# Patient Record
Sex: FEMALE | Race: WHITE | Employment: OTHER | URBAN - METROPOLITAN AREA
[De-identification: names, ages, dates, MRNs, and addresses within clinical notes are randomized per-mention and may not be internally consistent; named-entity substitution may affect disease eponyms.]

---

## 2018-08-25 PROBLEM — R07.9 CHEST PAIN: Status: ACTIVE | Noted: 2018-08-25

## 2018-08-27 PROBLEM — E03.9 HYPOTHYROIDISM: Status: ACTIVE | Noted: 2018-08-27

## 2018-08-27 PROBLEM — R94.39 ABNORMAL STRESS TEST: Status: ACTIVE | Noted: 2018-08-27

## 2018-08-27 PROBLEM — I10 HTN (HYPERTENSION): Status: ACTIVE | Noted: 2018-08-27

## 2018-08-27 PROBLEM — F11.20 OPIOID DEPENDENCE (HCC): Status: ACTIVE | Noted: 2018-08-27

## 2018-09-13 ENCOUNTER — HOSPITAL ENCOUNTER (OUTPATIENT)
Dept: OTHER | Age: 63
Discharge: HOME | End: 2018-09-13
Attending: NURSE PRACTITIONER | Admitting: NURSE PRACTITIONER

## 2018-09-13 ENCOUNTER — OFFICE VISIT (OUTPATIENT)
Dept: CARDIOLOGY CLINIC | Age: 63
End: 2018-09-13

## 2018-09-13 VITALS
DIASTOLIC BLOOD PRESSURE: 70 MMHG | WEIGHT: 172 LBS | HEART RATE: 80 BPM | SYSTOLIC BLOOD PRESSURE: 110 MMHG | HEIGHT: 64 IN | BODY MASS INDEX: 29.37 KG/M2

## 2018-09-13 DIAGNOSIS — I10 ESSENTIAL HYPERTENSION: ICD-10-CM

## 2018-09-13 DIAGNOSIS — Z79.899 LONG TERM CURRENT USE OF ANTIPSYCHOTIC MEDICATION: ICD-10-CM

## 2018-09-13 DIAGNOSIS — I25.10 CORONARY ARTERY DISEASE INVOLVING NATIVE CORONARY ARTERY OF NATIVE HEART WITHOUT ANGINA PECTORIS: Primary | ICD-10-CM

## 2018-09-13 LAB
ANION GAP SERPL CALCULATED.3IONS-SCNC: 12 MMOL/L (ref 3–16)
BUN BLDV-MCNC: 10 MG/DL (ref 7–20)
CALCIUM SERPL-MCNC: 9.3 MG/DL (ref 8.3–10.6)
CHLORIDE BLD-SCNC: 97 MMOL/L (ref 99–110)
CO2: 28 MMOL/L (ref 21–32)
CREAT SERPL-MCNC: 0.8 MG/DL (ref 0.6–1.2)
GFR AFRICAN AMERICAN: >60
GFR NON-AFRICAN AMERICAN: >60
GLUCOSE BLD-MCNC: 364 MG/DL (ref 70–99)
POTASSIUM SERPL-SCNC: 4.3 MMOL/L (ref 3.5–5.1)
SODIUM BLD-SCNC: 137 MMOL/L (ref 136–145)

## 2018-09-13 PROCEDURE — G8598 ASA/ANTIPLAT THER USED: HCPCS | Performed by: NURSE PRACTITIONER

## 2018-09-13 PROCEDURE — 1111F DSCHRG MED/CURRENT MED MERGE: CPT | Performed by: NURSE PRACTITIONER

## 2018-09-13 PROCEDURE — G8427 DOCREV CUR MEDS BY ELIG CLIN: HCPCS | Performed by: NURSE PRACTITIONER

## 2018-09-13 PROCEDURE — 99213 OFFICE O/P EST LOW 20 MIN: CPT | Performed by: NURSE PRACTITIONER

## 2018-09-13 PROCEDURE — 4004F PT TOBACCO SCREEN RCVD TLK: CPT | Performed by: NURSE PRACTITIONER

## 2018-09-13 PROCEDURE — G8419 CALC BMI OUT NRM PARAM NOF/U: HCPCS | Performed by: NURSE PRACTITIONER

## 2018-09-13 PROCEDURE — 3017F COLORECTAL CA SCREEN DOC REV: CPT | Performed by: NURSE PRACTITIONER

## 2018-09-13 RX ORDER — HYDROCHLOROTHIAZIDE 25 MG/1
25 TABLET ORAL DAILY
COMMUNITY
End: 2018-12-05 | Stop reason: SDUPTHER

## 2018-09-13 NOTE — COMMUNICATION BODY
[address]     Outpatient Follow Up Note    CHIEF COMPLAINT / HPI: Hospital Follow Up secondary to status post coronary artery stenting    Hospital record has been reviewed  Hospital Course progressed as follows per discharge summary:   pressents with left sided chest pain with radiation to left arm associated with perspiration and dizziness x 1 day. The pt is visiting her daughter in [facility] . She lver in [place] She has never experienced similar sx in the past. She does not have any pain at this time. No recent stress test. No fever, chills  She also reports chronic intermittent left side d abdominal pain . ~Abnormal stress test  - s/p Severe 1 vessel CAD involving the LAD with successful revascularization to luminal diameter of 3.0 mm with [facility] on 8/27/18  - On aspirin/effient, statin, beta blocker     ~Hypertension : Blood pressure stable, continue Coreg 6.25 mg twice a day, hydrochlorothiazide 50 mg daily (disch summary inst to stop taking), lisinopril 5 mg daily (disch inst for benazpril)     ~Insulin-dependent diabetes mellitus  - Hemoglobin A1c is 12.2, consult diabetic educator; Continue Lantus 40 units daily  ~Opioid dependence: Continue Subutex 8 mg 3 times a day daily  ~Benzo Withdrawal:  start xanax 0.5mg TID       Melinda Rollins is 59 y.o. female who presents today for a routine follow up after a recent hospitalization related to the above mentioned issues. She recalls having LUE numbness and felt palpitations in her chest.  Subjective:   Since the time of discharge, the patient admits their symptoms have improved. She denies significant chest pain. There is no SOB/GREEN. The patient is not experiencing palpitations. She developed swelling in her feet about a week after discharge. She started taking HCTZ 4 days ago    These symptoms are improving over the last few weeks.  Her home BP runs around 120/60    With regard to medication therapy the patient has been compliant with prescribed

## 2018-09-13 NOTE — PROGRESS NOTES
EXT: dago LE pitting edema, no calf tenderness. Pulses are present bilaterally. DATA:    Lab Results   Component Value Date    ALT 14 08/25/2018    AST 16 08/25/2018    ALKPHOS 72 08/25/2018    BILITOT <0.2 08/25/2018     Lab Results   Component Value Date    CREATININE 0.9 08/29/2018    BUN 11 08/29/2018     08/29/2018    K 3.2 (L) 08/29/2018     08/29/2018    CO2 27 08/29/2018     No results found for: TSH, R9FGZXT, T8YSIYP, THYROIDAB  Lab Results   Component Value Date    WBC 10.5 08/29/2018    HGB 12.1 08/29/2018    HCT 35.2 (L) 08/29/2018    .0 (H) 08/29/2018     08/29/2018     No components found for: CHLPL  Lab Results   Component Value Date    TRIG 214 (H) 08/28/2018     Lab Results   Component Value Date    HDL 27 (L) 08/28/2018     Lab Results   Component Value Date    LDLCALC 94 08/28/2018     Lab Results   Component Value Date    LABVLDL 43 08/28/2018     Radiology Review:  Pertinent images / reports were reviewed as a part of this visit and reveals the following:    Last Stress Test: 8/27/18:      Summary       Exercise EKG with 1mm inferolateral downsloping ST depression       Normal LV size and systolic function.       There is normal isotope uptake at stress and rest. There is no evidence of    myocardial ischemia or scar       Cardiac Cath 8/27/18:  Anatomy:   LM-normal  LAD-20% proximal, eccentric, calcified, 90% mid, calcified  Cx-normal  OM1- normal  RCA-40% mid with FFR 0.89  RPDA- patent  LVEF- 60%, LVEDP 10 mmHg  PCI: LAD 90% to 0% with a 2.5 mm x 18 mm Xience Namita [facility] post dilated with a 3.0 mm NC balloon     Impression:  1.  Severe 1 vessel CAD involving the LAD with successful revascularization to luminal diameter of 3.0 mm with [facility]. 2.  Mild-to-moderate 1 vessel CAD involving the RCA with FFR 0.89. 3.  Normal LV systolic function.     Plan:  1.  Enteric-coated aspirin 81 mg daily and definitely.   2.  Effient/equivalent for minimum of 6-12 months preferably closer to 12 months. 3.  Change simvastatin to atorvastatin.  Goal LDL less than 70. Assessment:      Diagnosis Orders   1. Coronary artery disease involving native coronary artery of native heart without angina pectoris   ~s/p PTCA LAD  ~denies recurrence of CP  ~exercise limited d/t neck/back pain from previous AAs  ~stopped smoking at discharge     2. Essential hypertension   ~controlled     3. Long term current use of antipsychotic medication   ~diuretic / ace  ~low K+ during hospitalization ; misinformation regarding HCTZ (? Stop or take)  ~ace : changed from lisinopril to benazepril  Basic Metabolic Panel       Patient  is stable since hospital discharge. Plan:  BMP today    ~consider either changing diuretic or adding aldactone for edema   ~inst to limit intake of salty snacks   F/U in four weeks at our [address] [facility] location     I have addressed the patient's cardiac risk factors and adjusted pharmacologic treatment as needed. In addition, I have reinforced the need for patient directed risk factor modification. Further evaluation will be based upon the patient's clinical course and testing results. All questions and concerns were addressed to the patient. Alternatives to  treatment were discussed. The patient  currently  is not smoking: stopped two weeks ago from 1.5 ppd. The risks related to smoking were reviewed with the patient. Recommend maintaining a smoke-free lifestyle. Products available for smoking cessation were discussed > using Nicoderm . Dual Antiplatelet therapy  has been recommended / prescribed for this patient. Education conducted on adverse reactions including bleeding was discussed. The patient verbalizes understanding. Pt is on a BB  Pt is on an ace-i  Pt is on a statin      Saturated fat diet discussed : SMBG 123  Exercise program discussed    Thank you for allowing to us to participate in the care of Elroy DevineLuis Miguel [address]  Documentation of today's visit sent to PCP

## 2018-09-13 NOTE — LETTER
[address] [facility] - [address]  [address] [facility] [address]  Phone: [phone]  Fax: [phone]      September 13, 2018     Referring Not In System (Inactive)  No address on file    Patient: Steffi Houston  MR Number: [id number]  YOB: 1955  Date of Visit: 9/13/2018    Dear Primary NP: Gerard Mishra, [address] [facility]     Outpatient Follow Up Note    [facility] / HPI: Hospital Follow Up secondary to status post coronary artery stenting    Hospital record has been reviewed  Hospital Course progressed as follows per discharge summary:   pressents with left sided chest pain with radiation to left arm associated with perspiration and dizziness x 1 day. The pt is visiting her daughter in [facility] . She lver in [place] She has never experienced similar sx in the past. She does not have any pain at this time. No recent stress test. No fever, chills  She also reports chronic intermittent left side d abdominal pain . ~Abnormal stress test  - s/p Severe 1 vessel CAD involving the LAD with successful revascularization to luminal diameter of 3.0 mm with [facility] on 8/27/18  - On aspirin/effient, statin, beta blocker     ~Hypertension : Blood pressure stable, continue Coreg 6.25 mg twice a day, hydrochlorothiazide 50 mg daily (disch summary inst to stop taking), lisinopril 5 mg daily (disch inst for benazpril)     ~Insulin-dependent diabetes mellitus  - Hemoglobin A1c is 12.2, consult diabetic educator; Continue Lantus 40 units daily  ~Opioid dependence: Continue Subutex 8 mg 3 times a day daily  ~Benzo Withdrawal:  start xanax 0.5mg TID       Steffi Houston is 59 y.o. female who presents today for a routine follow up after a recent hospitalization related to the above mentioned issues. She recalls having LUE numbness and felt palpitations in her chest.  Subjective:   Since the time of discharge, the patient admits their symptoms have improved. Cuff Size: Medium Adult    Pulse: 80    Weight: 172 lb (78 kg)    Height: 5' 4\" (1.626 m)         VITALS:  /62 (Site: Left Upper Arm, Position: Sitting, Cuff Size: Medium Adult)   Pulse 80   Ht 5' 4\" (1.626 m)   Wt 172 lb (78 kg)   BMI 29.52 kg/m²      CONSTITUTIONAL: Cooperative, no apparent distress, and appears well nourished / developed  NEUROLOGIC:  Awake and orientated to person, place and time. PSYCH: Calm affect. SKIN: Warm and dry; Rt groin unremarkable. HEENT: Sclera non-icteric, normocephalic, neck supple, no elevation of JVP, normal carotid pulses with no bruits and thyroid normal size. LUNGS:  No increased work of breathing and slightly diminished RLL . CARDIOVASCULAR:  Regular rate 76 and rhythm with no murmurs, gallops, rubs, or abnormal heart sounds, normal PMI. The apical impulses not displaced. Heart tones are crisp and normal                                                                                            Cervical veins are not engorged                 JVP less than 8 cm H2O                                                                              The carotid upstroke is normal in amplitude and contour without delay or bruit    ABDOMEN:  Normal bowel sounds, non-distended and non-tender to palpation   EXT: dago LE pitting edema, no calf tenderness. Pulses are present bilaterally.     DATA:    Lab Results   Component Value Date    ALT 14 08/25/2018    AST 16 08/25/2018    ALKPHOS 72 08/25/2018    BILITOT <0.2 08/25/2018     Lab Results   Component Value Date    CREATININE 0.9 08/29/2018    BUN 11 08/29/2018     08/29/2018    K 3.2 (L) 08/29/2018     08/29/2018    CO2 27 08/29/2018     No results found for: TSH, J4ALJXR, C6HQOBV, THYROIDAB  Lab Results   Component Value Date    WBC 10.5 08/29/2018    HGB 12.1 08/29/2018    HCT 35.2 (L) 08/29/2018    .0 (H) 08/29/2018     08/29/2018     No components found for: Ute Amend Lab Results   Component Value Date    TRIG 214 (H) 08/28/2018     Lab Results   Component Value Date    HDL 27 (L) 08/28/2018     Lab Results   Component Value Date    LDLCALC 94 08/28/2018     Lab Results   Component Value Date    LABVLDL 43 08/28/2018     Radiology Review:  Pertinent images / reports were reviewed as a part of this visit and reveals the following:    Last Stress Test: 8/27/18:      Summary       Exercise EKG with 1mm inferolateral downsloping ST depression       Normal LV size and systolic function.       There is normal isotope uptake at stress and rest. There is no evidence of    myocardial ischemia or scar       Cardiac Cath 8/27/18:  Anatomy:   LM-normal  LAD-20% proximal, eccentric, calcified, 90% mid, calcified  Cx-normal  OM1- normal  RCA-40% mid with FFR 0.89  RPDA- patent  LVEF- 60%, LVEDP 10 mmHg  PCI: LAD 90% to 0% with a 2.5 mm x 18 mm Xience Namita [facility] post dilated with a 3.0 mm NC balloon     Impression:  1.  Severe 1 vessel CAD involving the LAD with successful revascularization to luminal diameter of 3.0 mm with [facility]. 2.  Mild-to-moderate 1 vessel CAD involving the RCA with FFR 0.89. 3.  Normal LV systolic function.     Plan:  1.  Enteric-coated aspirin 81 mg daily and definitely. 2.  Effient/equivalent for minimum of 6-12 months preferably closer to 12 months. 3.  Change simvastatin to atorvastatin.  Goal LDL less than 70. Assessment:      Diagnosis Orders   1. Coronary artery disease involving native coronary artery of native heart without angina pectoris   ~s/p PTCA LAD  ~denies recurrence of CP  ~exercise limited d/t neck/back pain from previous AAs  ~stopped smoking at discharge     2. Essential hypertension   ~controlled     3.  Long term current use of antipsychotic medication   ~diuretic / ace  ~low K+ during hospitalization ; misinformation regarding HCTZ (? Stop or take)  ~ace : changed from lisinopril to benazepril  Basic Metabolic Panel

## 2018-09-14 ENCOUNTER — TELEPHONE (OUTPATIENT)
Dept: CARDIOLOGY CLINIC | Age: 63
End: 2018-09-14

## 2018-09-14 DIAGNOSIS — Z79.899 ENCOUNTER FOR LONG-TERM (CURRENT) USE OF MEDICATIONS: Primary | ICD-10-CM

## 2018-09-14 NOTE — TELEPHONE ENCOUNTER
----- Message from TONY Barth - CNP sent at 9/14/2018  4:11 PM EDT -----  Ok   ~glucose up but not fasting      Notified pt of results. She is asking what the plan is for her diuretic going forward. She was discharged from hospital without HCTZ. She doesn't feel that one helped her much anyway. OV 9/13/18  1. Coronary artery disease involving native coronary artery of native heart without angina pectoris   ~s/p PTCA LAD  ~denies recurrence of CP  ~exercise limited d/t neck/back pain from previous AAs  ~stopped smoking at discharge      2. Essential hypertension   ~controlled      3.  Long term current use of antipsychotic medication   ~diuretic / ace  ~low K+ during hospitalization ; misinformation regarding HCTZ (? Stop or take)  ~ace : changed from lisinopril to benazepril  Basic Metabolic Panel         Patient  is stable since hospital discharge.     Plan:    BMP today               ~consider either changing diuretic or adding aldactone for edema              ~inst to limit intake of salty snacks              F/U in four weeks at our [facility]

## 2018-09-17 RX ORDER — SPIRONOLACTONE 25 MG/1
25 TABLET ORAL DAILY
Qty: 30 TABLET | Refills: 5 | Status: SHIPPED | OUTPATIENT
Start: 2018-09-17 | End: 2019-03-26 | Stop reason: SDUPTHER

## 2018-09-25 ENCOUNTER — TELEPHONE (OUTPATIENT)
Dept: CARDIOLOGY CLINIC | Age: 63
End: 2018-09-25

## 2018-09-25 NOTE — TELEPHONE ENCOUNTER
Wants lab orders sent to [address] phone# [phone] in [facility] so she can get labs drawn there .

## 2018-11-21 ENCOUNTER — TELEPHONE (OUTPATIENT)
Dept: CARDIOLOGY CLINIC | Age: 63
End: 2018-11-21

## 2018-12-05 ENCOUNTER — OFFICE VISIT (OUTPATIENT)
Dept: CARDIOLOGY CLINIC | Age: 63
End: 2018-12-05
Payer: MEDICARE

## 2018-12-05 ENCOUNTER — HOSPITAL ENCOUNTER (OUTPATIENT)
Dept: VASCULAR LAB | Age: 63
Discharge: HOME | End: 2018-12-05
Payer: MEDICARE

## 2018-12-05 VITALS
SYSTOLIC BLOOD PRESSURE: 142 MMHG | BODY MASS INDEX: 28.95 KG/M2 | HEART RATE: 82 BPM | DIASTOLIC BLOOD PRESSURE: 62 MMHG | WEIGHT: 169.6 LBS | HEIGHT: 64 IN | OXYGEN SATURATION: 93 %

## 2018-12-05 DIAGNOSIS — I87.2 PERIPHERAL VENOUS INSUFFICIENCY: Primary | ICD-10-CM

## 2018-12-05 DIAGNOSIS — R60.0 LOCALIZED EDEMA: ICD-10-CM

## 2018-12-05 DIAGNOSIS — I10 ESSENTIAL HYPERTENSION: ICD-10-CM

## 2018-12-05 DIAGNOSIS — I25.10 CORONARY ARTERY DISEASE INVOLVING NATIVE CORONARY ARTERY OF NATIVE HEART WITHOUT ANGINA PECTORIS: Primary | ICD-10-CM

## 2018-12-05 PROCEDURE — 93970 EXTREMITY STUDY: CPT

## 2018-12-05 PROCEDURE — 4004F PT TOBACCO SCREEN RCVD TLK: CPT | Performed by: NURSE PRACTITIONER

## 2018-12-05 PROCEDURE — G8484 FLU IMMUNIZE NO ADMIN: HCPCS | Performed by: NURSE PRACTITIONER

## 2018-12-05 PROCEDURE — G8598 ASA/ANTIPLAT THER USED: HCPCS | Performed by: NURSE PRACTITIONER

## 2018-12-05 PROCEDURE — 3017F COLORECTAL CA SCREEN DOC REV: CPT | Performed by: NURSE PRACTITIONER

## 2018-12-05 PROCEDURE — 99214 OFFICE O/P EST MOD 30 MIN: CPT | Performed by: NURSE PRACTITIONER

## 2018-12-05 PROCEDURE — G8419 CALC BMI OUT NRM PARAM NOF/U: HCPCS | Performed by: NURSE PRACTITIONER

## 2018-12-05 PROCEDURE — G8427 DOCREV CUR MEDS BY ELIG CLIN: HCPCS | Performed by: NURSE PRACTITIONER

## 2018-12-05 RX ORDER — BENAZEPRIL HYDROCHLORIDE 20 MG/1
20 TABLET ORAL DAILY
Qty: 90 TABLET | Refills: 3 | Status: SHIPPED | OUTPATIENT
Start: 2018-12-05 | End: 2019-06-26 | Stop reason: SDUPTHER

## 2018-12-05 RX ORDER — CARVEDILOL 3.12 MG/1
3.12 TABLET ORAL 2 TIMES DAILY WITH MEALS
Qty: 60 TABLET | Refills: 5 | Status: SHIPPED | OUTPATIENT
Start: 2018-12-05 | End: 2019-09-05 | Stop reason: SDUPTHER

## 2018-12-05 RX ORDER — HYDROCHLOROTHIAZIDE 25 MG/1
25 TABLET ORAL DAILY
Qty: 30 TABLET | Refills: 5 | Status: SHIPPED | OUTPATIENT
Start: 2018-12-05 | End: 2019-09-05 | Stop reason: SDUPTHER

## 2018-12-06 RX ORDER — PRASUGREL 10 MG/1
10 TABLET, FILM COATED ORAL DAILY
Qty: 30 TABLET | Refills: 11 | Status: SHIPPED | OUTPATIENT
Start: 2018-12-06 | End: 2019-09-05 | Stop reason: ALTCHOICE

## 2019-03-25 PROBLEM — I25.10 CORONARY ARTERY DISEASE DUE TO LIPID RICH PLAQUE: Status: ACTIVE | Noted: 2019-03-25

## 2019-03-25 PROBLEM — I25.83 CORONARY ARTERY DISEASE DUE TO LIPID RICH PLAQUE: Status: ACTIVE | Noted: 2019-03-25

## 2019-03-25 PROBLEM — E78.2 MIXED HYPERLIPIDEMIA: Status: ACTIVE | Noted: 2019-03-25

## 2019-03-28 RX ORDER — SPIRONOLACTONE 25 MG/1
25 TABLET ORAL DAILY
Qty: 90 TABLET | Refills: 0 | Status: SHIPPED | OUTPATIENT
Start: 2019-03-28 | End: 2019-04-04 | Stop reason: SDUPTHER

## 2019-04-04 NOTE — TELEPHONE ENCOUNTER
Medication Question/Concern    What is the name of the medication you need to speak with someone about? Doseage of the medication:    How are you taking this medication:    What issues/concerns are you having with this medication:                Medication Refill    When was your last appointment with cardiology?  12/5/18  (if 1year or longer, please schedule an appointment)    Medication needing refilled:spironolactone (ALDACTONE) 25 MG tablet    Doseage of the medication: 25mg    How are you taking this medication (QD, BID, TID, QID, PRN): 1 tab daily    Patient want a 30 or 90 day supply called in: 80    Which Pharmacy are we sending the medication [address], [address] [facility] Phone number:    Pharmacy Fax number:

## 2019-04-08 RX ORDER — SPIRONOLACTONE 25 MG/1
25 TABLET ORAL DAILY
Qty: 90 TABLET | Refills: 0 | Status: SHIPPED | OUTPATIENT
Start: 2019-04-08 | End: 2019-09-05 | Stop reason: SDUPTHER

## 2019-06-09 PROBLEM — Z95.5 STATUS POST INSERTION OF DRUG-ELUTING STENT INTO LEFT ANTERIOR DESCENDING (LAD) ARTERY: Status: ACTIVE | Noted: 2019-06-09

## 2019-06-26 RX ORDER — BENAZEPRIL HYDROCHLORIDE 20 MG/1
20 TABLET ORAL DAILY
Qty: 90 TABLET | Refills: 3 | Status: SHIPPED | OUTPATIENT
Start: 2019-06-26 | End: 2019-09-05 | Stop reason: SDUPTHER

## 2019-06-26 NOTE — TELEPHONE ENCOUNTER
Medication Refill    When was your last appointment with cardiology?      (if  it's been more than 1 year, please go ahead and schedule an appointment with the physician while you have the patient on the phone)      Medication needing refilled:  benazepril (LOTENSIN) 20 MG tablet       Doseage of the medication:    How are you taking this medication (QD, BID, TID, QID, PRN):  1 tab daily   Patient want a 30 or 90 day supply called in:  80  Which Pharmacy are we sending the medication to    [address]  Medication Question/Concern    (if  it's been more than 1 year, please go ahead and schedule an appointment with the physician while you have the patient on the phone)    What is the name of the medication you need to speak with someone about? Doseage of the medication:    How are you taking this medication:    What issues/concerns are you having with this medication:      Medication Samples    (if  it's been more than 1 year, please go ahead and schedule an appointment with the physician while you have the patient on the phone)    Medication:    Doseage of the medication:    How are you taking this medication (QD, BID, TID, QID, PRN):     in the office or Mail to your home?

## 2019-09-04 NOTE — PROGRESS NOTES
Keflex 500 mg four times a day for seven days  5. Repeat fasting lipids and liver enzymes in mid November 2019 (before Thanksgiving)   6. Elevate legs when able to to help control leg swelling    7. Follow up in the spring of 2020. Scribe's attestation: This note was scribed in the presence of Jaymie Espinosa M.D. by Madeleine Prince RN    Physician Attestation: The scribe's documentation has been prepared under my direction and personally reviewed by me in its entirety. I confirm that the note above accurately reflects all work, treatment, procedures, and medical decision making performed by me. An  electronic signature was used to authenticate this note. Harvey Fonseca MD, [facility], [address]

## 2019-09-05 ENCOUNTER — OFFICE VISIT (OUTPATIENT)
Dept: CARDIOLOGY CLINIC | Age: 64
End: 2019-09-05
Payer: MEDICARE

## 2019-09-05 VITALS
SYSTOLIC BLOOD PRESSURE: 148 MMHG | HEART RATE: 91 BPM | HEIGHT: 64 IN | BODY MASS INDEX: 30.05 KG/M2 | DIASTOLIC BLOOD PRESSURE: 76 MMHG | WEIGHT: 176 LBS

## 2019-09-05 DIAGNOSIS — I10 ESSENTIAL HYPERTENSION: ICD-10-CM

## 2019-09-05 DIAGNOSIS — Z95.5 STATUS POST INSERTION OF DRUG-ELUTING STENT INTO LEFT ANTERIOR DESCENDING (LAD) ARTERY: ICD-10-CM

## 2019-09-05 DIAGNOSIS — R60.0 LOCALIZED EDEMA: ICD-10-CM

## 2019-09-05 DIAGNOSIS — I25.10 CORONARY ARTERY DISEASE DUE TO LIPID RICH PLAQUE: Primary | ICD-10-CM

## 2019-09-05 DIAGNOSIS — E78.2 MIXED HYPERLIPIDEMIA: ICD-10-CM

## 2019-09-05 DIAGNOSIS — I25.83 CORONARY ARTERY DISEASE DUE TO LIPID RICH PLAQUE: Primary | ICD-10-CM

## 2019-09-05 PROCEDURE — 4004F PT TOBACCO SCREEN RCVD TLK: CPT | Performed by: INTERNAL MEDICINE

## 2019-09-05 PROCEDURE — 99214 OFFICE O/P EST MOD 30 MIN: CPT | Performed by: INTERNAL MEDICINE

## 2019-09-05 PROCEDURE — G8598 ASA/ANTIPLAT THER USED: HCPCS | Performed by: INTERNAL MEDICINE

## 2019-09-05 PROCEDURE — 3017F COLORECTAL CA SCREEN DOC REV: CPT | Performed by: INTERNAL MEDICINE

## 2019-09-05 PROCEDURE — G8427 DOCREV CUR MEDS BY ELIG CLIN: HCPCS | Performed by: INTERNAL MEDICINE

## 2019-09-05 PROCEDURE — 93000 ELECTROCARDIOGRAM COMPLETE: CPT | Performed by: INTERNAL MEDICINE

## 2019-09-05 PROCEDURE — G8417 CALC BMI ABV UP PARAM F/U: HCPCS | Performed by: INTERNAL MEDICINE

## 2019-09-05 RX ORDER — SPIRONOLACTONE 25 MG/1
25 TABLET ORAL DAILY
Qty: 90 TABLET | Refills: 3 | Status: SHIPPED | OUTPATIENT
Start: 2019-09-05 | End: 2021-03-08

## 2019-09-05 RX ORDER — ATORVASTATIN CALCIUM 80 MG/1
80 TABLET, FILM COATED ORAL NIGHTLY
Qty: 90 TABLET | Refills: 3 | Status: SHIPPED | OUTPATIENT
Start: 2019-09-05 | End: 2021-02-01

## 2019-09-05 RX ORDER — BENAZEPRIL HYDROCHLORIDE 20 MG/1
20 TABLET ORAL DAILY
Qty: 90 TABLET | Refills: 3 | Status: SHIPPED | OUTPATIENT
Start: 2019-09-05 | End: 2020-12-01

## 2019-09-05 RX ORDER — ASPIRIN 81 MG/1
81 TABLET ORAL DAILY
Qty: 90 TABLET | Refills: 3 | COMMUNITY
Start: 2019-09-05

## 2019-09-05 RX ORDER — CARVEDILOL 3.12 MG/1
3.12 TABLET ORAL 2 TIMES DAILY WITH MEALS
Qty: 180 TABLET | Refills: 3 | Status: SHIPPED | OUTPATIENT
Start: 2019-09-05 | End: 2021-03-19

## 2019-09-05 RX ORDER — NITROGLYCERIN 0.4 MG/1
TABLET SUBLINGUAL
Qty: 25 TABLET | Refills: 1 | Status: SHIPPED | OUTPATIENT
Start: 2019-09-05 | End: 2021-09-01

## 2019-09-05 RX ORDER — HYDROCHLOROTHIAZIDE 25 MG/1
25 TABLET ORAL DAILY
Qty: 90 TABLET | Refills: 3 | Status: SHIPPED | OUTPATIENT
Start: 2019-09-05 | End: 2019-09-30 | Stop reason: ALTCHOICE

## 2019-09-05 RX ORDER — CEPHALEXIN 500 MG/1
500 CAPSULE ORAL 4 TIMES DAILY
Qty: 28 CAPSULE | Refills: 0 | Status: SHIPPED | OUTPATIENT
Start: 2019-09-05 | End: 2019-09-12

## 2019-09-30 ENCOUNTER — TELEPHONE (OUTPATIENT)
Dept: CARDIOLOGY CLINIC | Age: 64
End: 2019-09-30

## 2019-09-30 DIAGNOSIS — I25.83 CORONARY ARTERY DISEASE DUE TO LIPID RICH PLAQUE: ICD-10-CM

## 2019-09-30 DIAGNOSIS — I25.10 CORONARY ARTERY DISEASE DUE TO LIPID RICH PLAQUE: ICD-10-CM

## 2019-09-30 DIAGNOSIS — E78.2 MIXED HYPERLIPIDEMIA: ICD-10-CM

## 2019-09-30 DIAGNOSIS — I10 HYPERTENSION, UNSPECIFIED TYPE: Primary | ICD-10-CM

## 2019-09-30 RX ORDER — FUROSEMIDE 40 MG/1
40 TABLET ORAL DAILY
Qty: 90 TABLET | Refills: 3 | Status: SHIPPED | OUTPATIENT
Start: 2019-09-30 | End: 2020-04-08 | Stop reason: DRUGHIGH

## 2020-04-01 ENCOUNTER — TELEPHONE (OUTPATIENT)
Dept: CARDIOLOGY CLINIC | Age: 65
End: 2020-04-01

## 2020-04-03 ENCOUNTER — TELEPHONE (OUTPATIENT)
Dept: CARDIOLOGY CLINIC | Age: 65
End: 2020-04-03

## 2020-04-08 ENCOUNTER — VIRTUAL VISIT (OUTPATIENT)
Dept: CARDIOLOGY CLINIC | Age: 65
End: 2020-04-08
Payer: MEDICARE

## 2020-04-08 VITALS
BODY MASS INDEX: 27.31 KG/M2 | SYSTOLIC BLOOD PRESSURE: 140 MMHG | HEART RATE: 86 BPM | WEIGHT: 160 LBS | DIASTOLIC BLOOD PRESSURE: 82 MMHG | HEIGHT: 64 IN

## 2020-04-08 PROCEDURE — 3017F COLORECTAL CA SCREEN DOC REV: CPT | Performed by: INTERNAL MEDICINE

## 2020-04-08 PROCEDURE — 99422 OL DIG E/M SVC 11-20 MIN: CPT | Performed by: INTERNAL MEDICINE

## 2020-04-08 PROCEDURE — 4004F PT TOBACCO SCREEN RCVD TLK: CPT | Performed by: INTERNAL MEDICINE

## 2020-04-08 PROCEDURE — G8427 DOCREV CUR MEDS BY ELIG CLIN: HCPCS | Performed by: INTERNAL MEDICINE

## 2020-04-08 PROCEDURE — G8417 CALC BMI ABV UP PARAM F/U: HCPCS | Performed by: INTERNAL MEDICINE

## 2020-04-08 RX ORDER — LEVOTHYROXINE SODIUM 0.2 MG/1
200 TABLET ORAL DAILY
COMMUNITY

## 2020-04-08 RX ORDER — FUROSEMIDE 40 MG/1
40 TABLET ORAL DAILY PRN
Qty: 90 TABLET | Refills: 3 | Status: SHIPPED
Start: 2020-04-08 | End: 2021-09-01

## 2020-04-08 NOTE — PROGRESS NOTES
Provider, MD   benazepril (LOTENSIN) 20 MG tablet Take 1 tablet by mouth daily Yes Tasha Aparicio MD   spironolactone (ALDACTONE) 25 MG tablet Take 1 tablet by mouth daily Yes Tasha Aparicio MD   carvedilol (COREG) 3.125 MG tablet Take 1 tablet by mouth 2 times daily (with meals) Yes Tasha Aparicio MD   aspirin 81 MG EC tablet Take 1 tablet by mouth daily Yes Tasha Aparicio MD   nitroGLYCERIN (NITROSTAT) 0.4 MG SL tablet up to max of 3 total doses. If no relief after 1 dose, call 911. Yes Tasha Aparicio MD   atorvastatin (LIPITOR) 80 MG tablet Take 1 tablet by mouth nightly Yes Tasha Aparicio MD   insulin lispro (HUMALOG) 100 UNIT/ML pen Inject 0-18 Units into the skin 3 times daily (with meals) **High Dose Corrective Algorithm**  Glucose: Dose:               No Insulin  140-199           3 Units  200-249 6 Units  250-299 9 Units  300-349 12 Units  350-400 15 Units  Over 400  18 Units Yes Mindy Virk MD   LANTUS SOLOSTAR 100 UNIT/ML injection pen Inject 40 Units into the skin nightly Yes Mindy Virk MD   buprenorphine (SUBUTEX) 8 MG SUBL SL tablet  Yes Historical Provider, MD   ALPRAZolam Keegan Mom) 1 MG tablet Take 2 mg by mouth nightly as needed for Sleep or Anxiety. . Yes Historical Provider, MD       Social History     Tobacco Use    Smoking status: Current Every Day Smoker     Packs/day: 1.00     Years: 40.00     Pack years: 40.00     Types: Cigarettes    Smokeless tobacco: Never Used   Substance Use Topics    Alcohol use: No    Drug use: No          PHYSICAL EXAMINATION:  [ INSTRUCTIONS:  \"[x]\" Indicates a positive item  \"[]\" Indicates a negative item  -- DELETE ALL ITEMS NOT EXAMINED]  Vital Signs: (As obtained by patient/caregiver or practitioner observation)    Blood pressure- 140/82 Heart rate- 86   Respiratory rate- NA   Temperature-NA  Pulse oximetry- NA    Constitutional: [x] Appears well-developed and well-nourished [x] No apparent distress        Mental status  [x] Alert and awake  [x] Oriented to person/place/time [x]Able to follow commands      Eyes:  EOM    [x]  Normal    Sclera  [x]  Normal           Discharge [x]  None visible      HENT:   [x] Normocephalic, atraumatic. [x] Mouth/Throat: Mucous membranes are moist.     External Ears [x] Normal      Neck: [x] No visualized mass     Pulmonary/Chest: [x] Respiratory effort normal.  [x] No visualized signs of difficulty breathing or respiratory distress       Musculoskeletal:   [x] Normal gait with no signs of ataxia         [x] Normal range of motion of neck        Neurological:        [x] No Facial Asymmetry (Cranial nerve 7 motor function) (limited exam to video visit)               Skin:        [x] No significant exanthematous lesions or discoloration noted on facial skin           Psychiatric:       [x] Normal Affect         Other pertinent observable physical exam findings-     I have reviewed all pertinent lab results and diagnostic testing. Lab Results   Component Value Date    CHOL 164 08/28/2018    TRIG 214 08/28/2018    HDL 27 08/28/2018    LDLCALC 94 08/28/2018     Lab Results   Component Value Date     09/13/2018    K 4.3 09/13/2018    K 3.2 08/29/2018    CL 97 09/13/2018    CO2 28 09/13/2018    GLUCOSE 364 09/13/2018    BUN 10 09/13/2018    CREATININE 0.8 09/13/2018    CALCIUM 9.3 09/13/2018    GFRAA >60 09/13/2018     Lab Results   Component Value Date    ALT 14 08/25/2018    AST 16 08/25/2018       ECG 9/5/19: SR, 91 bpm    Cardiac Cath 8/27/18:  Anatomy:   LM-normal  LAD-20% proximal, eccentric, calcified, 90% mid, calcified  Cx-normal  OM1- normal  RCA-40% mid with FFR 0.89  RPDA- patent  LVEF- 60%, LVEDP 10 mmHg  PCI: LAD 90% to 0% with a 2.5 mm x 18 mm Xience Namita [facility] post dilated with a 3.0 mm NC balloon     Impression:  1. Severe 1 vessel CAD involving the LAD with successful revascularization to luminal diameter of 3.0 mm with [facility].   2.  Mild-to-moderate 1 vessel CAD involving the RCA regularly as she had frequent urination. She is willing to take as needed for worsening edema  ~ 12/2018 venous duplex negative for DVT/SVT  Cystic structure left medial popliteal fosse likely related to ruptured Baker's cyst.     Plan > continue Spironolactone daily. Take furosemide daily as needed    Plan:  1. No change in medication. Okay to take furosemide 40 mg daily as needed for worsening edema   2. Check fasting lipids, liver enzymes, and BMP  3. Call office for concerning cardiac symptoms. 4.  Follow up in six months (virtual visit)       Travis Guillory is a 62 y.o. female being evaluated by a Virtual Visit (video visit) encounter to address concerns as mentioned above. A caregiver was present when appropriate. Due to this being a TeleHealth encounter (During AKIEV-45 public health emergency), evaluation of the following organ systems was limited: Vitals/Constitutional/EENT/Resp/CV/GI//MS/Neuro/Skin/Heme-Lymph-Imm. Pursuant to the emergency declaration under the [address] [place] [address] and the [employer] and [employer] Act, this Virtual Visit was conducted with patient's (and/or legal guardian's) consent, to reduce the patient's risk of exposure to COVID-19 and provide necessary medical care. The patient (and/or legal guardian) has also been advised to contact this office for worsening conditions or problems, and seek emergency medical treatment and/or call 911 if deemed necessary. Services were provided through a video synchronous discussion virtually to substitute for in-person clinic visit. Patient and provider were located at their individual homes. --Hodan Bai MD on 4/16/2020 at 1:28 PM     Scribe's attestation:   This note was scribed in the presence of Hodan Bai M.D. by Beatriz Myers RN    Physician Attestation: The scribe's documentation has been prepared under my direction and

## 2020-09-23 ENCOUNTER — TELEPHONE (OUTPATIENT)
Dept: CARDIOLOGY CLINIC | Age: 65
End: 2020-09-23

## 2020-09-24 ENCOUNTER — TELEPHONE (OUTPATIENT)
Dept: CARDIOLOGY CLINIC | Age: 65
End: 2020-09-24

## 2020-10-23 ENCOUNTER — TELEPHONE (OUTPATIENT)
Dept: CARDIOLOGY CLINIC | Age: 65
End: 2020-10-23

## 2020-10-23 NOTE — TELEPHONE ENCOUNTER
She had a virtual appt with npts today but at the time of her appt she was on the phone because someone in her family  . Asking that npts call her back.

## 2020-12-01 RX ORDER — BENAZEPRIL HYDROCHLORIDE 20 MG/1
TABLET ORAL
Qty: 90 TABLET | Refills: 2 | Status: SHIPPED | OUTPATIENT
Start: 2020-12-01 | End: 2021-09-13

## 2021-02-01 DIAGNOSIS — I25.10 CORONARY ARTERY DISEASE DUE TO LIPID RICH PLAQUE: Primary | ICD-10-CM

## 2021-02-01 DIAGNOSIS — I25.83 CORONARY ARTERY DISEASE DUE TO LIPID RICH PLAQUE: Primary | ICD-10-CM

## 2021-02-01 DIAGNOSIS — E78.2 MIXED HYPERLIPIDEMIA: ICD-10-CM

## 2021-02-01 NOTE — TELEPHONE ENCOUNTER
Medication Refill    Medication needing refilled: atorvastatin (LIPITOR) 80 MG tablet    Dosage of the medication:    How are you taking this medication (QD, BID, TID, QID, PRN):    30 or 90 day supply called in:    When will you run out of your medication:    Which Pharmacy are we sending the medication to?: [facility] [place], [place] - [address] [facility] [phone] - F [phone]     Pt is scheduled with [facility] pt says she will get labs when she comes in on 02/22

## 2021-03-08 ENCOUNTER — TELEPHONE (OUTPATIENT)
Dept: CARDIOLOGY CLINIC | Age: 66
End: 2021-03-08

## 2021-03-08 RX ORDER — SPIRONOLACTONE 25 MG/1
TABLET ORAL
Qty: 90 TABLET | Refills: 0 | Status: SHIPPED | OUTPATIENT
Start: 2021-03-08 | End: 2021-06-24

## 2021-03-08 NOTE — TELEPHONE ENCOUNTER
Received a refill request for Spironolactone 25 mg daily. Last lab in [facility] (5/15/20) showed K+ 3.2. Active lab orders for lipids, liver enzymes, CK, and BMP in Epic from 2/1/21 but not drawn yet. [facility] for patient to get lab work drawn soon. Will mail lab orders to patient's home. Instructed to call office back with any questions.

## 2021-05-13 ENCOUNTER — TELEPHONE (OUTPATIENT)
Dept: CARDIOLOGY CLINIC | Age: 66
End: 2021-05-13

## 2021-05-13 NOTE — TELEPHONE ENCOUNTER
Pt calling me back about efrain richter with [facility] pt states she is now seeing a Dr Anthony Kraus in [place] and will not be making an appt. She'd like to thank [facility] for all he has done for her. Also, she will need her medical records faxed to Dr Anthony Kraus at [phone] Ph [phone].  Pls call to advise Thank you

## 2021-06-24 RX ORDER — SPIRONOLACTONE 25 MG/1
TABLET ORAL
Qty: 90 TABLET | Refills: 0 | Status: SHIPPED | OUTPATIENT
Start: 2021-06-24 | End: 2022-04-21 | Stop reason: SDUPTHER

## 2021-09-01 RX ORDER — FUROSEMIDE 40 MG/1
TABLET ORAL
Qty: 90 TABLET | Refills: 3 | Status: SHIPPED | OUTPATIENT
Start: 2021-09-01

## 2021-09-01 RX ORDER — NITROGLYCERIN 0.4 MG/1
TABLET SUBLINGUAL
Qty: 25 TABLET | Refills: 1 | Status: SHIPPED | OUTPATIENT
Start: 2021-09-01

## 2021-09-13 RX ORDER — BENAZEPRIL HYDROCHLORIDE 20 MG/1
TABLET ORAL
Qty: 90 TABLET | Refills: 0 | Status: SHIPPED | OUTPATIENT
Start: 2021-09-13 | End: 2022-04-21 | Stop reason: SDUPTHER

## 2021-09-13 NOTE — TELEPHONE ENCOUNTER
Received refill request for Benazepril from [employer]     Last ov:04/08/2020 [facility]    Last labs:07/22/2021 BMP    Last Refill:12/01/2020 #90 tabs w/ 2 refills    Next appointment:none

## 2021-09-14 NOTE — TELEPHONE ENCOUNTER
Pt states she has a new doctor closer to home and will see about them continuing medication and will call back if needed.

## 2022-04-19 NOTE — TELEPHONE ENCOUNTER
Received refill request for Benzapril HCL 20mg from [id number] [address] : 04/08/2020 virtual with [place] : 07/22/2021 BMP    Last Refill : 09/13/2021 90 w/0 refills    Next OV : Pt has no scheduled OV

## 2022-04-21 RX ORDER — SPIRONOLACTONE 25 MG/1
TABLET ORAL
Qty: 90 TABLET | Refills: 0 | Status: SHIPPED | OUTPATIENT
Start: 2022-04-21

## 2022-04-21 RX ORDER — BENAZEPRIL HYDROCHLORIDE 20 MG/1
TABLET ORAL
Qty: 90 TABLET | Refills: 3 | OUTPATIENT
Start: 2022-04-21

## 2022-04-21 RX ORDER — CARVEDILOL 3.12 MG/1
TABLET ORAL
Qty: 180 TABLET | Refills: 0 | Status: SHIPPED | OUTPATIENT
Start: 2022-04-21

## 2022-04-21 RX ORDER — ATORVASTATIN CALCIUM 80 MG/1
TABLET, FILM COATED ORAL
Qty: 90 TABLET | Refills: 0 | Status: SHIPPED | OUTPATIENT
Start: 2022-04-21

## 2022-04-21 RX ORDER — BENAZEPRIL HYDROCHLORIDE 20 MG/1
TABLET ORAL
Qty: 90 TABLET | Refills: 0 | Status: SHIPPED | OUTPATIENT
Start: 2022-04-21

## 2022-04-21 NOTE — TELEPHONE ENCOUNTER
Last OV: 4-8-20 vv kjc  Last labs: 4-11-22 bmp 3-9-21 lipid  Appt scheduled : none  Last refill: lipitor 2-1-21  Dmitri Sanchez 6-24-21  Dmitri Sanchez 9-13-21  lipitor 2-1-21

## 2022-04-21 NOTE — TELEPHONE ENCOUNTER
Med Refill    benazepril (LOTENSIN) 20 MG tablet   As Directed  90 tablet  TAKE ONE TABLET BY MOUTH DAILY    spironolactone (ALDACTONE) 25 MG tablet   As Directed  90 tablet  TAKE ONE TABLET BY MOUTH DAILY    carvedilol (COREG) 3.125 MG tablet   As Directed  180 tablet  TAKE ONE TABLET BY MOUTH TWICE A DAY WITH MEALS    atorvastatin (LIPITOR) 80 MG tablet   As Directed  90 tablet  TAKE ONE TABLET BY MOUTH NIGHTLY    [facility] [id number] - [place] - [address] [place] [address] [place] [address]   Phone:  [phone]  Fax:  [phone]

## 2022-07-26 ENCOUNTER — HOSPITAL ENCOUNTER (EMERGENCY)
Age: 67
Discharge: HOME | End: 2022-07-26
Payer: COMMERCIAL

## 2022-07-26 VITALS
HEART RATE: 90 BPM | WEIGHT: 151 LBS | RESPIRATION RATE: 18 BRPM | DIASTOLIC BLOOD PRESSURE: 73 MMHG | SYSTOLIC BLOOD PRESSURE: 149 MMHG | TEMPERATURE: 98.6 F | OXYGEN SATURATION: 97 % | BODY MASS INDEX: 25.78 KG/M2 | HEIGHT: 64 IN

## 2022-07-26 DIAGNOSIS — L72.3 INFECTED SEBACEOUS CYST: Primary | ICD-10-CM

## 2022-07-26 DIAGNOSIS — L08.9 INFECTED SEBACEOUS CYST: Primary | ICD-10-CM

## 2022-07-26 PROCEDURE — 10061 I&D ABSCESS COMP/MULTIPLE: CPT

## 2022-07-26 PROCEDURE — 6370000000 HC RX 637 (ALT 250 FOR IP): Performed by: PHYSICIAN ASSISTANT

## 2022-07-26 PROCEDURE — 99283 EMERGENCY DEPT VISIT LOW MDM: CPT

## 2022-07-26 RX ORDER — SULFAMETHOXAZOLE AND TRIMETHOPRIM 800; 160 MG/1; MG/1
1 TABLET ORAL ONCE
Status: COMPLETED | OUTPATIENT
Start: 2022-07-26 | End: 2022-07-26

## 2022-07-26 RX ORDER — CEPHALEXIN 250 MG/1
500 CAPSULE ORAL ONCE
Status: COMPLETED | OUTPATIENT
Start: 2022-07-26 | End: 2022-07-26

## 2022-07-26 RX ORDER — CEPHALEXIN 500 MG/1
500 CAPSULE ORAL 4 TIMES DAILY
Qty: 40 CAPSULE | Refills: 0 | Status: SHIPPED | OUTPATIENT
Start: 2022-07-26 | End: 2022-08-05

## 2022-07-26 RX ORDER — HYDROCODONE BITARTRATE AND ACETAMINOPHEN 5; 325 MG/1; MG/1
1 TABLET ORAL ONCE
Status: COMPLETED | OUTPATIENT
Start: 2022-07-26 | End: 2022-07-26

## 2022-07-26 RX ORDER — SULFAMETHOXAZOLE AND TRIMETHOPRIM 800; 160 MG/1; MG/1
1 TABLET ORAL 2 TIMES DAILY
Qty: 20 TABLET | Refills: 0 | Status: SHIPPED | OUTPATIENT
Start: 2022-07-26 | End: 2022-08-05

## 2022-07-26 RX ADMIN — SULFAMETHOXAZOLE AND TRIMETHOPRIM 1 TABLET: 800; 160 TABLET ORAL at 14:25

## 2022-07-26 RX ADMIN — CEPHALEXIN 500 MG: 250 CAPSULE ORAL at 14:25

## 2022-07-26 RX ADMIN — HYDROCODONE BITARTRATE AND ACETAMINOPHEN 1 TABLET: 5; 325 TABLET ORAL at 14:25

## 2022-07-26 ASSESSMENT — ENCOUNTER SYMPTOMS
COUGH: 0
RHINORRHEA: 0
DIARRHEA: 0
WHEEZING: 0
NAUSEA: 0
ABDOMINAL PAIN: 0
VOMITING: 0
COLOR CHANGE: 1
SHORTNESS OF BREATH: 0

## 2022-07-26 ASSESSMENT — PAIN DESCRIPTION - LOCATION: LOCATION: BREAST

## 2022-07-26 ASSESSMENT — PAIN SCALES - GENERAL: PAINLEVEL_OUTOF10: 10

## 2022-07-26 ASSESSMENT — PAIN DESCRIPTION - ORIENTATION: ORIENTATION: RIGHT

## 2022-07-26 NOTE — ED PROVIDER NOTES
[facility]        Pt Name: Onofre Barahona  MRN: [id number]  [place] 1955  Date of evaluation: 7/26/2022  Provider: David Garner PA-C  PCP: Referring Not In System (Inactive)  Note Started: 2:21 PM EDT       NANY. I have evaluated this patient. My supervising physician was available for consultation. CHIEF COMPLAINT       Chief Complaint   Patient presents with    Cyst     Underneath right breast. Been there for 1.5 years. Reports today there has been increase in pain. Reports having an appointment but didn't make it. Denies fevers, N/V/D. Draining in triage        HISTORY OF PRESENT ILLNESS   (Location, Timing/Onset, Context/Setting, Quality, Duration, Modifying Factors, Severity, Associated Signs and Symptoms)  Note limiting factors. Chief Complaint: Cyst     Onofre Barahona is a 78 y.o. female who presents for evaluation of a cyst on her right breast.  Patient states that this has been here for about a year and a half. States that she has had it drained in the past but has recurred. States that she has been using warm soaks and compresses. Reports increasing pain started last night. No fevers or chills. No abdominal pain nausea vomiting or diarrhea. She does have history of insulin-dependent diabetes as well, states that her sugars have been well controlled. She has no other complaints or concerns at this time    Nursing Notes were all reviewed and agreed with or any disagreements were addressed in the HPI. REVIEW OF SYSTEMS    (2-9 systems for level 4, 10 or more for level 5)     Review of Systems   Constitutional:  Negative for appetite change, chills and fever. HENT:  Negative for congestion and rhinorrhea. Respiratory:  Negative for cough, shortness of breath and wheezing. Cardiovascular:  Negative for chest pain. Gastrointestinal:  Negative for abdominal pain, diarrhea, nausea and vomiting. Genitourinary:  Negative for difficulty urinating, dysuria and hematuria. Musculoskeletal:  Negative for neck pain and neck stiffness. Skin:  Positive for color change and wound. Negative for rash. Neurological:  Negative for headaches. Positives and Pertinent negatives as per HPI. Except as noted above in the ROS, all other systems were reviewed and negative. PAST MEDICAL HISTORY     Past Medical History:   Diagnosis Date    Coronary artery disease due to lipid rich plaque 3/25/2019    Diabetes mellitus ([facility] [address]     Hyperlipidemia     Hypertension     Thyroid disease          SURGICAL HISTORY     Past Surgical History:   Procedure Laterality Date    APPENDECTOMY      CHOLECYSTECTOMY      COLONOSCOPY      ENDOSCOPY, COLON, DIAGNOSTIC      HYSTERECTOMY (CERVIX STATUS UNKNOWN)           CURRENTMEDICATIONS       Previous Medications    ALPRAZOLAM (XANAX) 1 MG TABLET    Take 2 mg by mouth nightly as needed for Sleep or Anxiety. .    ASPIRIN 81 MG EC TABLET    Take 1 tablet by mouth daily    ATORVASTATIN (LIPITOR) 80 MG TABLET    TAKE ONE TABLET BY MOUTH NIGHTLY    BENAZEPRIL (LOTENSIN) 20 MG TABLET    TAKE ONE TABLET BY MOUTH DAILY    BUPRENORPHINE (SUBUTEX) 8 MG SUBL SL TABLET        CARVEDILOL (COREG) 3.125 MG TABLET    TAKE ONE TABLET BY MOUTH TWICE A DAY WITH MEALS    FUROSEMIDE (LASIX) 40 MG TABLET    TAKE ONE TABLET BY MOUTH DAILY    INSULIN ASPART (NOVOLOG) 100 UNIT/ML INJECTION VIAL    Inject into the skin as needed for High Blood Sugar    INSULIN LISPRO (HUMALOG) 100 UNIT/ML PEN    Inject 0-18 Units into the skin 3 times daily (with meals) **High Dose Corrective Algorithm**  Glucose: Dose:               No Insulin  140-199           3 Units  200-249 6 Units  250-299 9 Units  300-349 12 Units  350-400 15 Units  Over 400  18 Units    LANTUS SOLOSTAR 100 UNIT/ML INJECTION PEN    Inject 40 Units into the skin nightly    LEVOTHYROXINE (SYNTHROID) 200 MCG TABLET    Take 200 mcg by mouth Daily NITROGLYCERIN (NITROSTAT) 0.4 MG SL TABLET    DISSOLVE 1 TAB UNDER TONGUE FOR CHEST PAIN - IF PAIN REMAINS AFTER 5 MIN, CALL 911 AND REPEAT DOSE. MAX 3 TABS IN 15 MINUTES    SPIRONOLACTONE (ALDACTONE) 25 MG TABLET    TAKE ONE TABLET BY MOUTH DAILY         ALLERGIES     Patient has no known allergies. FAMILYHISTORY     History reviewed. No pertinent family history. SOCIAL HISTORY       Social History     Tobacco Use    Smoking status: Every Day     Packs/day: 1.00     Years: 40.00     Pack years: 40.00     Types: Cigarettes    Smokeless tobacco: Never   Vaping Use    Vaping Use: Some days    Substances: Always   Substance Use Topics    Alcohol use: No    Drug use: No       SCREENINGS             PHYSICAL EXAM    (up to 7 for level 4, 8 or more for level 5)     ED Triage Vitals [07/26/22 1403]   BP Temp Temp Source Heart Rate Resp SpO2 Height Weight   (!) 149/73 98.6 °F (37 °C) Oral (!) 111 18 97 % 5' 4\" (1.626 m) 151 lb (68.5 kg)       Physical Exam  Vitals and nursing note reviewed. Constitutional:       Appearance: She is well-developed. She is not diaphoretic. HENT:      Head: Normocephalic and atraumatic. Right Ear: External ear normal.      Left Ear: External ear normal.      Nose: Nose normal.   Eyes:      General:         Right eye: No discharge. Left eye: No discharge. Cardiovascular:      Rate and Rhythm: Normal rate and regular rhythm. Heart sounds: Normal heart sounds. Pulmonary:      Effort: Pulmonary effort is normal. No respiratory distress. Breath sounds: Normal breath sounds. Chest:      Chest wall: Tenderness present. Abdominal:      General: There is no distension. Palpations: Abdomen is soft. Tenderness: There is no abdominal tenderness. Musculoskeletal:         General: Normal range of motion. Cervical back: Normal range of motion and neck supple. Skin:     General: Skin is warm and dry.    Neurological:      Mental Status: She is alert and oriented to person, place, and time. Psychiatric:         Behavior: Behavior normal.       DIAGNOSTIC RESULTS   LABS:    Labs Reviewed - No data to display    When ordered only abnormal lab results are displayed. All other labs were within normal range or not returned as of this dictation. EKG: When ordered, EKG's are interpreted by the Emergency Department Physician in the absence of a cardiologist.  Please see their note for interpretation of EKG. RADIOLOGY:   Non-plain film images such as CT, Ultrasound and MRI are read by the radiologist. Plain radiographic images are visualized and preliminarily interpreted by the ED Provider with the below findings:        Interpretation per the Radiologist below, if available at the time of this note:    No orders to display     No results found. PROCEDURES   Unless otherwise noted below, none     Procedures  Incision and Drainage Procedure Note    Indication: infected epidermal inclusion cyst    Procedure: The patient was positioned appropriately and the skin over the incision site was prepped with alcohol. Local anesthesia was not performed at the patient's request.   Area was already open. No indication for additional incision, however, with pressure copious purulent and sebaceous and material was expressed. Loculations were broken up using a hemostat and more of the material was able to be expressed. The drainage cavity was then dressed with gauze and tape. The patients tetanus status was up to date and did not require a booster dose. The patient tolerated the procedure well.     Complications: None      CRITICAL CARE TIME       CONSULTS:  None      EMERGENCY DEPARTMENT COURSE and DIFFERENTIAL DIAGNOSIS/MDM:   Vitals:    Vitals:    07/26/22 1403   BP: (!) 149/73   Pulse: (!) 111   Resp: 18   Temp: 98.6 °F (37 °C)   TempSrc: Oral   SpO2: 97%   Weight: 151 lb (68.5 kg)   Height: 5' 4\" (1.626 m)       Patient was given the following medications:  Medications   cephALEXin (KEFLEX) capsule 500 mg (500 mg Oral Given 7/26/22 1425)   sulfamethoxazole-trimethoprim (BACTRIM DS;SEPTRA DS) 800-160 MG per tablet 1 tablet (1 tablet Oral Given 7/26/22 1425)   HYDROcodone-acetaminophen (NORCO) 5-325 MG per tablet 1 tablet (1 tablet Oral Given 7/26/22 1425)         Is this patient to be included in the SEP-1 Core Measure due to severe sepsis or septic shock? No   Exclusion criteria - the patient is NOT to be included for SEP-1 Core Measure due to:  2+ SIRS criteria are not met    Patient presents for evaluation of cyst on her right breast.  On exam, she is resting comfortably in bed no acute distress and nontoxic. She was initially slightly tachycardic but vitals otherwise stable and she is afebrile. She has 2 x 1-1/2 cm area of fluctuance and erythema under the right breast with large open wound and active drainage noted. With additional expression copious purulent and sebaceous material able to be extracted including cyst sac. She was placed empirically on antibiotics, first dose given in the ED. The patient is otherwise not clinically toxic nor febrile. Differential diagnosis includes but is not limited to erysipelas, osteomyelitis, septic arthritis, lymphadenitis, DVT and necrotizing fasciitis. The appearance of the infection is such that I believe she will improve with oral antibiotics. She was advised to follow-up with their family doctor within the next 24-48 hours and return to the ED if there is no improvement or if the infection starts to worsen in any way. She is agreeable to this plan and stable for discharge at this time. FINAL IMPRESSION      1.  Infected sebaceous cyst          DISPOSITION/PLAN   DISPOSITION Decision To Discharge 07/26/2022 02:19:30 PM      PATIENT REFERRED TO:  Referring Not In System    Call   For a re-check in 2-3   days    [facility] Emergency Department  [place] [address]  [phone]  Go to   If symptoms worsen      DISCHARGE MEDICATIONS:  New Prescriptions    CEPHALEXIN (KEFLEX) 500 MG CAPSULE    Take 1 capsule by mouth in the morning and 1 capsule at noon and 1 capsule in the evening and 1 capsule before bedtime. Do all this for 10 days. SULFAMETHOXAZOLE-TRIMETHOPRIM (BACTRIM DS) 800-160 MG PER TABLET    Take 1 tablet by mouth in the morning and 1 tablet before bedtime. Do all this for 10 days.        DISCONTINUED MEDICATIONS:  Discontinued Medications    No medications on file              (Please note that portions of this note were completed with a voice recognition program.  Efforts were made to edit the dictations but occasionally words are mis-transcribed.)    Cristine Urias PA-C (electronically signed)           Joelle Yeager PA-C  07/26/22 [id number]

## 2022-10-26 NOTE — TELEPHONE ENCOUNTER
Last OV: 9-5-19 [facility] has been cancelling OV ,looks like he might be seeing another cardio.   Appt scheduled :  none  Last Teresefuad Zuluaga

## 2022-10-26 NOTE — TELEPHONE ENCOUNTER
She saw Dr Jeremiah Zepeda a cardiologist in [facility]. Since she has not been following with [facility] since '19 she would need to contact his office to have meds refilled.  Thanks

## 2022-10-27 RX ORDER — BENAZEPRIL HYDROCHLORIDE 20 MG/1
TABLET ORAL
Qty: 90 TABLET | Refills: 0 | OUTPATIENT
Start: 2022-10-27

## 2022-10-27 NOTE — TELEPHONE ENCOUNTER
Called pt and informed her that she needed to get her new Cardio dr to refill script with verbal understanding from pt .

## 2022-10-31 RX ORDER — BENAZEPRIL HYDROCHLORIDE 20 MG/1
TABLET ORAL
Qty: 90 TABLET | Refills: 0 | OUTPATIENT
Start: 2022-10-31

## 2022-11-01 RX ORDER — BENAZEPRIL HYDROCHLORIDE 20 MG/1
TABLET ORAL
Qty: 90 TABLET | Refills: 0 | OUTPATIENT
Start: 2022-11-01

## 2022-11-02 RX ORDER — BENAZEPRIL HYDROCHLORIDE 20 MG/1
TABLET ORAL
Qty: 90 TABLET | Refills: 0 | OUTPATIENT
Start: 2022-11-02

## 2022-11-03 RX ORDER — BENAZEPRIL HYDROCHLORIDE 20 MG/1
TABLET ORAL
Qty: 90 TABLET | Refills: 0 | OUTPATIENT
Start: 2022-11-03

## 2022-11-04 RX ORDER — BENAZEPRIL HYDROCHLORIDE 20 MG/1
TABLET ORAL
Qty: 90 TABLET | Refills: 0 | OUTPATIENT
Start: 2022-11-04

## 2022-11-07 ENCOUNTER — TELEPHONE (OUTPATIENT)
Dept: CARDIOLOGY CLINIC | Age: 67
End: 2022-11-07

## 2022-11-07 NOTE — TELEPHONE ENCOUNTER
Patient states she has been out of her benazepril and has been trying to get a hold of her new cardiologist Dr. Tavo Moreno and has never received a call back. She states she has been out of her meds for 2 weeks and needs to know what to do. Please advise.

## 2022-11-08 NOTE — TELEPHONE ENCOUNTER
Patient was last seen by Dr. Juanito Fabian (virtual visit) on 4/8/20. Patient has followed with her current cardiologist, Dr. Clive Levin, since April 2021. Dr. Juanito Fabian recommends that she contact Dr. Thomas Burrows office again for a refill and if she still does not get a response from his office, then she should contact her PCP to refill this medication. We have not seen the patient for 2.5 years and are unable to provide send in a refill for this medication.
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
## 2023-07-03 RX ORDER — ATORVASTATIN CALCIUM 80 MG/1
TABLET, FILM COATED ORAL
Qty: 90 TABLET | Refills: 0 | OUTPATIENT
Start: 2023-07-03

## 2023-07-03 NOTE — TELEPHONE ENCOUNTER
Received refill request for atorvastatin (LIPITOR) 80 MG tablet from [address] [place] [address] Last OV:  2022 Dr. José Ellison    Next OV: None. Last Labs: 2022 BMP, no Lipid order .     Last Filled:  2022 [facility]